# Patient Record
(demographics unavailable — no encounter records)

---

## 2025-06-04 NOTE — PHYSICAL EXAM
[General Appearance - Well Developed] : well developed [Normal Appearance] : normal appearance [Well Groomed] : well groomed [General Appearance - Well Nourished] : well nourished [No Deformities] : no deformities [General Appearance - In No Acute Distress] : no acute distress [Normal Conjunctiva] : the conjunctiva exhibited no abnormalities [Eyelids - No Xanthelasma] : the eyelids demonstrated no xanthelasmas [Normal Oral Mucosa] : normal oral mucosa [No Oral Pallor] : no oral pallor [No Oral Cyanosis] : no oral cyanosis [Normal Jugular Venous A Waves Present] : normal jugular venous A waves present [Normal Jugular Venous V Waves Present] : normal jugular venous V waves present [No Jugular Venous Martinez A Waves] : no jugular venous martinez A waves [Heart Rate And Rhythm] : heart rate and rhythm were normal [Heart Sounds] : normal S1 and S2 [Murmurs] : no murmurs present [Respiration, Rhythm And Depth] : normal respiratory rhythm and effort [Exaggerated Use Of Accessory Muscles For Inspiration] : no accessory muscle use [Auscultation Breath Sounds / Voice Sounds] : lungs were clear to auscultation bilaterally [Abdomen Soft] : soft [Abdomen Tenderness] : non-tender [Abdomen Mass (___ Cm)] : no abdominal mass palpated [Abnormal Walk] : normal gait [Gait - Sufficient For Exercise Testing] : the gait was sufficient for exercise testing [Nail Clubbing] : no clubbing of the fingernails [Cyanosis, Localized] : no localized cyanosis [Petechial Hemorrhages (___cm)] : no petechial hemorrhages [] : no ischemic changes [Oriented To Time, Place, And Person] : oriented to person, place, and time [Affect] : the affect was normal [Mood] : the mood was normal [No Anxiety] : not feeling anxious

## 2025-06-12 NOTE — HISTORY OF PRESENT ILLNESS
[FreeTextEntry1] : 6/4/25 - Pt reports localized left-sided CP, tender to touch, not related to exertion.  Patient reports palpitations.  Pt was found to have elevated BP up to 200 with PCP but she did not take her BP medications.  /93 .  Exam showed race to 1+ LE edema.  ECG showed NSR with q waves inferior leads.  I advised patient to be compliant with her BP medications.  I advised patient to wear a 7-day event monitor to rule out PAF.  7/24/24 - Please refer to NP note below.  Pt reports her BP machine showed arrhythmia recently and wants evaluation. Pt reports same dizziness, had seen neurologist and no significant findings noted. Patient denies CP, SOB, or palpitations. Patient denies h/o syncope. Pt is on Amlodipine 10 mg, ASA 81 mg, Metoprolol ER 25mg, Losartan 100 mg, Rosuvastatin 10 mg and Oxybutynin 5 mg. Pt is on Actos 15mg, Janumet 50-1000mg, Farxiga 5mg, Metformin 500mg.  Her home SBP ranged 110-120. Today's /71 P 60.  Patient has trace LE edema.  ECG showed NSR with low voltage.  I advised patient to wear a 7-day event monitor.  Patient wore a 7-day event monitor and it showed average HR of 75, rare APC's, rare PVC's, and 4 short runs of PAT (7 beats the longest).  1/11/24 - Patient reports dizziness for ine month when changing positions. Patient is on Amlodipine 10 mg, ASA 81 mg, Losartan 100 mg, Rosuvastatin 10 mg and Oxybutynin 5 mg. Her BP is 125-120- Patient feels dizzy when standing. She has 2/6 ALAN and trace edema.   3/16/23 - Patient reports that her mother passed away in [place] on 2/10 and she was dx with COVID on 2/12 with palpitations and HA. Patient was sent to ER due to CP. Patient reports worsening insomnia and lightheadedness as if she is walking on cloud. Patient also loss sense of smell and taste. Patient reports palpitations with fast HR lasting seconds. BP is 105-105-95. I advised patient to decrease dose of Amlodipine to 5 mg. I advised patient to undergo an echocardiogram.  Patient underwent an echocardiogram and it showed normal LV function without significant valvular pathology.   11/30/21 - Patient reports palpitations lasting seconds more frequently at night and when cooking dinner. Patient denies CP. Patient denies SOB. Patient has been non-compliant with BP medications, resumed Amlodipine 5 mg and Losartan 50 mg  3 days ago but her BP is still elevated. Patient felt that she has too many medications. I advised patient to start on Metoprolol ER 25 mg QD and Nitroglycerin 0.4 mg as needed. I advised patient to wear a Holter monitor.  Patient wore a Holter and it showed average HR 83, rare PVCs, rare PACs, couplets, and 2 PATs, the longest 10 beats at a rate of 125, the fastest 3 beats at a rate of 150.    6/10/19 - Patient denies CP, SOB, palpitations, or lightheadedness. Patient is not compliant on medications and takes all her medications sparingly. She had endoscopy and colonoscopy which showed polyps. Patient reports feeling tired and need s to nap. I advised patient to undergo an echocardiogram.

## 2025-06-12 NOTE — REASON FOR VISIT
[FreeTextEntry1] : 71 year-old female with HTN, DM, HTN, microhematuria, anemia, presented for followup.    Patient was last seen on 7/24/24 - Please refer to NP note below.  Pt reports her BP machine showed arrhythmia recently and wants evaluation. Pt reports same dizziness, had seen neurologist and no significant findings noted. Patient denies CP, SOB, or palpitations. Patient denies h/o syncope. Pt is on Amlodipine 10 mg, ASA 81 mg, Metoprolol ER 25mg, Losartan 100 mg, Rosuvastatin 10 mg and Oxybutynin 5 mg. Pt is on Actos 15mg, Janumet 50-1000mg, Farxiga 5mg, Metformin 500mg.  Her home SBP ranged 110-120. Today's /71 P 60.  Patient has trace LE edema.  ECG showed NSR with low voltage.  I advised patient to wear a 7-day event monitor.  Patient wore a 7-day event monitor and it showed average HR of 75, rare APC's, rare PVC's, and 4 short runs of PAT (7 beats the longest).  Patient is on Amlodipine 10 mg, ASA 81 mg, Losartan 100 mg, Rosuvastatin 10 mg and Oxybutynin 5 mg.   Patient underwent an echocardiogram 3/16/23  and it showed normal LV function without significant valvular pathology.   Patient wore a Holter 11/30/21 and it showed average HR 83, rare PVCs, rare PACs, couplets, and 2 PATs, the longest 10 beats at a rate of 125, the fastest 3 beats at a rate of 150.   Patient underwent an echocardiogram 6/10/19 and it showed normal LV function without significant valvular pathology.

## 2025-06-12 NOTE — HISTORY OF PRESENT ILLNESS
[FreeTextEntry1] : 6/4/25 - Pt reports localized left-sided CP, tender to touch, not related to exertion.  Patient reports palpitations.  Pt was found to have elevated BP up to 200 with PCP but she did not take her BP medications.  /93 .  Exam showed race to 1+ LE edema.  ECG showed NSR with q waves inferior leads.  I advised patient to be compliant with her BP medications.  I advised patient to wear a 7-day event monitor to rule out PAF.  7/24/24 - Please refer to NP note below.  Pt reports her BP machine showed arrhythmia recently and wants evaluation. Pt reports same dizziness, had seen neurologist and no significant findings noted. Patient denies CP, SOB, or palpitations. Patient denies h/o syncope. Pt is on Amlodipine 10 mg, ASA 81 mg, Metoprolol ER 25mg, Losartan 100 mg, Rosuvastatin 10 mg and Oxybutynin 5 mg. Pt is on Actos 15mg, Janumet 50-1000mg, Farxiga 5mg, Metformin 500mg.  Her home SBP ranged 110-120. Today's /71 P 60.  Patient has trace LE edema.  ECG showed NSR with low voltage.  I advised patient to wear a 7-day event monitor.  Patient wore a 7-day event monitor and it showed average HR of 75, rare APC's, rare PVC's, and 4 short runs of PAT (7 beats the longest).  1/11/24 - Patient reports dizziness for ine month when changing positions. Patient is on Amlodipine 10 mg, ASA 81 mg, Losartan 100 mg, Rosuvastatin 10 mg and Oxybutynin 5 mg. Her BP is 125-120- Patient feels dizzy when standing. She has 2/6 ALAN and trace edema.   3/16/23 - Patient reports that her mother passed away in [place] on 2/10 and she was dx with COVID on 2/12 with palpitations and HA. Patient was sent to ER due to CP. Patient reports worsening insomnia and lightheadedness as if she is walking on cloud. Patient also loss sense of smell and taste. Patient reports palpitations with fast HR lasting seconds. BP is 105-105-95. I advised patient to decrease dose of Amlodipine to 5 mg. I advised patient to undergo an echocardiogram.  Patient underwent an echocardiogram and it showed normal LV function without significant valvular pathology.   11/30/21 - Patient reports palpitations lasting seconds more frequently at night and when cooking dinner. Patient denies CP. Patient denies SOB. Patient has been non-compliant with BP medications, resumed Amlodipine 5 mg and Losartan 50 mg  3 days ago but her BP is still elevated. Patient felt that she has too many medications. I advised patient to start on Metoprolol ER 25 mg QD and Nitroglycerin 0.4 mg as needed. I advised patient to wear a Holter monitor.  Patient wore a Holter and it showed average HR 83, rare PVCs, rare PACs, couplets, and 2 PATs, the longest 10 beats at a rate of 125, the fastest 3 beats at a rate of 150.    6/10/19 - Patient denies CP, SOB, palpitations, or lightheadedness. Patient is not compliant on medications and takes all her medications sparingly. She had endoscopy and colonoscopy which showed polyps. Patient reports feeling tired and need s to nap. I advised patient to undergo an echocardiogram.